# Patient Record
(demographics unavailable — no encounter records)

---

## 2024-11-26 NOTE — HISTORY OF PRESENT ILLNESS
[de-identified] : 72 year old female s/p sphenoidotomy 11/18/24 presents for follow up Path: 1.  Paranasal sinus, left sphenoid, endoscopic sinus surgery - Fungus balls (morphologically consistent with Aspergillus) - Chronic sinusitis  Reports appropriate post op congestion and pain Using saline rinses 2x a day Denies fevers Denies vision changes Denies signs of CSF leak  Completed abx

## 2024-11-26 NOTE — REASON FOR VISIT
[Subsequent Evaluation] : a subsequent evaluation for [Family Member] : family member [Other: _____] : [unfilled] [Other: ______] : provided by NOEMI [FreeTextEntry2] : s/p sphenoidotomy 11/18/24 [Interpreters_IDNumber] : [id number] [Interpreters_FullName] : cierra [TWNoteComboBox1] : Sinhala

## 2024-12-17 NOTE — REASON FOR VISIT
[Subsequent Evaluation] : a subsequent evaluation for [Family Member] : family member [Other: ______] : provided by NOEMI [FreeTextEntry2] :  s/p sphenoidotomy 11/18/24  [Interpreters_IDNumber] : [id number] [Interpreters_FullName] : Rachel [TWNoteComboBox1] : Azeri

## 2024-12-17 NOTE — HISTORY OF PRESENT ILLNESS
[de-identified] : 72 year old female  s/p Left sphenoidotomy 11/18/24 presents for post op LCV 11/26/24 at which time debridement  States breathing through the nose is good Reports ongoing intermittent PND with frequent throat clearing Denies recent nasal congestion, anterior rhinorrhea, facial pain/pressure, epistaxis Sense of smell is good  Using saline rinses 2x a day    Path: 1. Paranasal sinus, left sphenoid, endoscopic sinus surgery - Fungus balls (morphologically consistent with Aspergillus) - Chronic sinusitis

## 2025-01-28 NOTE — REASON FOR VISIT
[Subsequent Evaluation] : a subsequent evaluation for [Family Member] : family member [Language Line ] : provided by Language Line   [FreeTextEntry2] : s/p sphenoidotomy 11/18/24 [Interpreters_FullName] : Ana [TWNoteComboBox1] : Croatian

## 2025-01-28 NOTE — HISTORY OF PRESENT ILLNESS
[de-identified] : 72 year old female s/p sphenoidotomy 11/18/24 presents for post op LCV 12/17/24 at which time debridement  States breathing through the nose is good Reports ongoing intermittent PND with frequent throat clearing Denies recent nasal congestion, anterior rhinorrhea, facial pain/pressure, epistaxis Sense of smell is good Using saline rinses 2x a day  Path: 1. Paranasal sinus, left sphenoid, endoscopic sinus surgery - Fungus balls (morphologically consistent with Aspergillus) - Chronic sinusitis